# Patient Record
Sex: MALE | Race: BLACK OR AFRICAN AMERICAN | NOT HISPANIC OR LATINO | Employment: UNEMPLOYED | URBAN - METROPOLITAN AREA
[De-identification: names, ages, dates, MRNs, and addresses within clinical notes are randomized per-mention and may not be internally consistent; named-entity substitution may affect disease eponyms.]

---

## 2022-07-17 ENCOUNTER — HOSPITAL ENCOUNTER (EMERGENCY)
Facility: HOSPITAL | Age: 37
Discharge: HOME | End: 2022-07-17
Attending: FAMILY MEDICINE
Payer: MEDICAID

## 2022-07-17 VITALS
TEMPERATURE: 99 F | OXYGEN SATURATION: 98 % | HEART RATE: 95 BPM | BODY MASS INDEX: 24.11 KG/M2 | DIASTOLIC BLOOD PRESSURE: 92 MMHG | HEIGHT: 66 IN | SYSTOLIC BLOOD PRESSURE: 136 MMHG | WEIGHT: 150 LBS | RESPIRATION RATE: 20 BRPM

## 2022-07-17 DIAGNOSIS — M79.605 LEFT LEG PAIN: Primary | ICD-10-CM

## 2022-07-17 PROCEDURE — 25000003 PHARM REV CODE 250: Mod: ER | Performed by: FAMILY MEDICINE

## 2022-07-17 PROCEDURE — 99284 EMERGENCY DEPT VISIT MOD MDM: CPT | Mod: ER

## 2022-07-17 RX ORDER — METHOCARBAMOL 500 MG/1
500 TABLET, FILM COATED ORAL
Status: COMPLETED | OUTPATIENT
Start: 2022-07-17 | End: 2022-07-17

## 2022-07-17 RX ORDER — KETOROLAC TROMETHAMINE 10 MG/1
10 TABLET, FILM COATED ORAL
Status: COMPLETED | OUTPATIENT
Start: 2022-07-17 | End: 2022-07-17

## 2022-07-17 RX ORDER — CYCLOBENZAPRINE HCL 10 MG
10 TABLET ORAL 3 TIMES DAILY PRN
Qty: 30 TABLET | Refills: 0 | Status: SHIPPED | OUTPATIENT
Start: 2022-07-17 | End: 2024-03-19

## 2022-07-17 RX ORDER — NAPROXEN 500 MG/1
500 TABLET ORAL 2 TIMES DAILY WITH MEALS
Qty: 20 TABLET | Refills: 0 | Status: SHIPPED | OUTPATIENT
Start: 2022-07-17 | End: 2024-03-19

## 2022-07-17 RX ADMIN — METHOCARBAMOL TABLETS 500 MG: 500 TABLET, COATED ORAL at 05:07

## 2022-07-17 RX ADMIN — KETOROLAC TROMETHAMINE 10 MG: 10 TABLET, FILM COATED ORAL at 05:07

## 2022-07-17 NOTE — ED PROVIDER NOTES
Encounter Date: 7/17/2022       History     Chief Complaint   Patient presents with    Leg Pain     C/o pain to right anterior thigh and left inner upper thigh that started today after running. Denies any pain to testicles.     37-year-old male complains of bilateral anterior thigh pain after playing baseball.  Was stretching his legs more.  No direct injury or fall.  Able to stand up walk and bear weight.  No swelling or redness.  Patient took tramadol from his friend before coming to ED.    The history is provided by the patient.     Review of patient's allergies indicates:  No Known Allergies  History reviewed. No pertinent past medical history.  History reviewed. No pertinent surgical history.  History reviewed. No pertinent family history.  Social History     Tobacco Use    Smoking status: Current Every Day Smoker    Smokeless tobacco: Never Used   Substance Use Topics    Alcohol use: No    Drug use: Yes     Review of Systems   Constitutional: Negative for fever.   HENT: Negative for sore throat.    Respiratory: Negative for shortness of breath.    Cardiovascular: Negative for chest pain.   Gastrointestinal: Negative for nausea.   Genitourinary: Negative for dysuria.   Musculoskeletal: Positive for myalgias. Negative for back pain, gait problem, neck pain and neck stiffness.   Skin: Negative for rash.   Neurological: Negative for weakness.   Hematological: Does not bruise/bleed easily.   All other systems reviewed and are negative.      Physical Exam     Initial Vitals [07/17/22 1714]   BP Pulse Resp Temp SpO2   (!) 136/92 95 20 98.6 °F (37 °C) 98 %      MAP       --         Physical Exam    Nursing note and vitals reviewed.  Constitutional: Vital signs are normal. He appears well-developed and well-nourished. He is active. No distress.   HENT:   Head: Normocephalic.   Nose: Nose normal.   Mouth/Throat: Oropharynx is clear and moist and mucous membranes are normal.   Eyes: Conjunctivae, EOM and lids are  normal.   Neck: Neck supple.   Normal range of motion.  Cardiovascular: Normal rate, regular rhythm, S1 normal, S2 normal and normal heart sounds.   Pulmonary/Chest: Breath sounds normal.   Musculoskeletal:      Right upper arm: Normal.      Left upper arm: Normal.      Cervical back: Normal range of motion and neck supple.      Right lower leg: Normal.      Left lower leg: Normal.      Comments: Bilateral anterior thigh tenderness.     Neurological: He is alert and oriented to person, place, and time. He has normal strength. GCS eye subscore is 4. GCS verbal subscore is 5. GCS motor subscore is 6.   Skin: Skin is warm. Capillary refill takes less than 2 seconds.   Psychiatric: He has a normal mood and affect. His speech is normal and behavior is normal. Thought content normal. Cognition and memory are normal.         ED Course   Procedures  Labs Reviewed - No data to display       Imaging Results    None          Medications   ketorolac tablet 10 mg (has no administration in time range)   methocarbamoL tablet 500 mg (has no administration in time range)     Medical Decision Making:   ED Management:  Bilateral thigh strain after playing baseball.  Naproxen and cyclobenzaprine prescription.  Adequate hydration.  Follow-up ED with any worsening symptoms.                      Clinical Impression:   Final diagnoses:  [M79.605] Left leg pain (Primary)          ED Disposition Condition    Discharge Stable        ED Prescriptions     Medication Sig Dispense Start Date End Date Auth. Provider    naproxen (NAPROSYN) 500 MG tablet Take 1 tablet (500 mg total) by mouth 2 (two) times daily with meals. 20 tablet 7/17/2022  Michael Mart MD    cyclobenzaprine (FLEXERIL) 10 MG tablet Take 1 tablet (10 mg total) by mouth 3 (three) times daily as needed. 30 tablet 7/17/2022  Michael Mart MD        Follow-up Information     Follow up With Specialties Details Why Contact Info    Primarycare physician  Schedule an  appointment as soon as possible for a visit in 1 week F/U            Michael Mart MD  07/17/22 1764

## 2024-03-19 ENCOUNTER — OFFICE VISIT (OUTPATIENT)
Dept: GASTROENTEROLOGY | Facility: CLINIC | Age: 39
End: 2024-03-19
Payer: MEDICAID

## 2024-03-19 VITALS
SYSTOLIC BLOOD PRESSURE: 150 MMHG | HEART RATE: 86 BPM | WEIGHT: 162.56 LBS | DIASTOLIC BLOOD PRESSURE: 97 MMHG | BODY MASS INDEX: 26.13 KG/M2 | HEIGHT: 66 IN

## 2024-03-19 DIAGNOSIS — F17.200 TOBACCO DEPENDENCE: ICD-10-CM

## 2024-03-19 DIAGNOSIS — K21.9 GASTROESOPHAGEAL REFLUX DISEASE, UNSPECIFIED WHETHER ESOPHAGITIS PRESENT: Primary | ICD-10-CM

## 2024-03-19 PROCEDURE — 99999 PR PBB SHADOW E&M-EST. PATIENT-LVL III: CPT | Mod: PBBFAC,,, | Performed by: NURSE PRACTITIONER

## 2024-03-19 PROCEDURE — 3080F DIAST BP >= 90 MM HG: CPT | Mod: CPTII,,, | Performed by: NURSE PRACTITIONER

## 2024-03-19 PROCEDURE — 3008F BODY MASS INDEX DOCD: CPT | Mod: CPTII,,, | Performed by: NURSE PRACTITIONER

## 2024-03-19 PROCEDURE — 1160F RVW MEDS BY RX/DR IN RCRD: CPT | Mod: CPTII,,, | Performed by: NURSE PRACTITIONER

## 2024-03-19 PROCEDURE — 1159F MED LIST DOCD IN RCRD: CPT | Mod: CPTII,,, | Performed by: NURSE PRACTITIONER

## 2024-03-19 PROCEDURE — 99204 OFFICE O/P NEW MOD 45 MIN: CPT | Mod: S$PBB,,, | Performed by: NURSE PRACTITIONER

## 2024-03-19 PROCEDURE — 99213 OFFICE O/P EST LOW 20 MIN: CPT | Mod: PBBFAC,PN | Performed by: NURSE PRACTITIONER

## 2024-03-19 PROCEDURE — 3077F SYST BP >= 140 MM HG: CPT | Mod: CPTII,,, | Performed by: NURSE PRACTITIONER

## 2024-03-19 RX ORDER — OMEPRAZOLE 40 MG/1
40 CAPSULE, DELAYED RELEASE ORAL DAILY
Qty: 30 CAPSULE | Refills: 2 | Status: SHIPPED | OUTPATIENT
Start: 2024-03-19 | End: 2025-03-19

## 2024-03-19 NOTE — PROGRESS NOTES
"Subjective:       Patient ID: Carlos Mayorga is a 38 y.o. male.    Chief Complaint: Gastroesophageal Reflux    37 y/o male presents to clinic with c/o GERD.     Gastroesophageal Reflux  He complains of abdominal pain, belching, heartburn and nausea. He reports no chest pain, no choking, no coughing, no dysphagia, no globus sensation or no wheezing. This is a recurrent problem. The current episode started more than 1 month ago. The problem has been gradually worsening. The heartburn duration is several minutes. The heartburn is located in the abdomen and substernum. The symptoms are aggravated by certain foods (fried foods). Risk factors include smoking/tobacco exposure. He has tried an antacid (TUMS) for the symptoms. The treatment provided mild relief.       History reviewed. No pertinent past medical history.    History reviewed. No pertinent surgical history.    History reviewed. No pertinent family history.    Social History     Socioeconomic History    Marital status: Single   Tobacco Use    Smoking status: Every Day    Smokeless tobacco: Never   Substance and Sexual Activity    Alcohol use: No    Drug use: Yes    Sexual activity: Not Currently       Review of Systems   Constitutional:  Negative for appetite change and unexpected weight change.   HENT:  Negative for trouble swallowing.    Respiratory:  Negative for cough, choking and wheezing.    Cardiovascular:  Negative for chest pain.   Gastrointestinal:  Positive for abdominal pain, heartburn and nausea. Negative for constipation, diarrhea and dysphagia.   Psychiatric/Behavioral:  Negative for dysphoric mood.          Objective:     Vitals:    03/19/24 0809   BP: (!) 150/97   BP Location: Left arm   Patient Position: Sitting   BP Method: Medium (Automatic)   Pulse: 86   Weight: 73.8 kg (162 lb 9.4 oz)   Height: 5' 6" (1.676 m)          Physical Exam  Constitutional:       General: He is not in acute distress.     Appearance: Normal appearance.   HENT:     "  Head: Normocephalic.   Eyes:      Conjunctiva/sclera: Conjunctivae normal.   Pulmonary:      Effort: Pulmonary effort is normal. No respiratory distress.   Musculoskeletal:         General: Normal range of motion.      Cervical back: Normal range of motion.   Skin:     General: Skin is warm and dry.   Neurological:      Mental Status: He is alert and oriented to person, place, and time.   Psychiatric:         Mood and Affect: Mood normal.         Behavior: Behavior normal.               Assessment:         ICD-10-CM ICD-9-CM   1. Gastroesophageal reflux disease, unspecified whether esophagitis present  K21.9 530.81   2. Tobacco dependence  F17.200 305.1       Plan:       Gastroesophageal reflux disease, unspecified whether esophagitis present  -     omeprazole (PRILOSEC) 40 MG capsule; Take 1 capsule (40 mg total) by mouth once daily.  Dispense: 30 capsule; Refill: 2  -     Case Request Endoscopy: EGD (ESOPHAGOGASTRODUODENOSCOPY)  -     CBC Auto Differential; Future; Expected date: 03/19/2024  -     Comprehensive Metabolic Panel; Future; Expected date: 03/19/2024    Tobacco dependence        -    Encouraged smoking cessation        -    Patient declined referral to smoking cessation         program      Follow up if symptoms worsen or fail to improve.     Patient's Medications   New Prescriptions    OMEPRAZOLE (PRILOSEC) 40 MG CAPSULE    Take 1 capsule (40 mg total) by mouth once daily.   Previous Medications    No medications on file   Modified Medications    No medications on file   Discontinued Medications    CYCLOBENZAPRINE (FLEXERIL) 10 MG TABLET    Take 1 tablet (10 mg total) by mouth 3 (three) times daily as needed.    IBUPROFEN (ADVIL,MOTRIN) 600 MG TABLET    Take 1 tablet (600 mg total) by mouth every 6 (six) hours as needed for Pain.    MOMETASONE (NASONEX) 50 MCG/ACTUATION NASAL SPRAY    2 sprays by Nasal route once daily.    NAPROXEN (NAPROSYN) 500 MG TABLET    Take 1 tablet (500 mg total) by mouth 2  (two) times daily with meals.

## 2024-03-19 NOTE — PATIENT INSTRUCTIONS
EGD Prep Instructions    [facility] [address] [place]  [address]    You are scheduled for an EGD with Dr. Rose on 4/2/2024 at [facility].  You will enter through the [facility] entrance and check in at Same Day Surgery.    Nothing to eat or drink after midnight before the procedure.  You MAY brush your teeth.    You MAY take your blood pressure, heart, and seizure medication on the morning of the procedure, with a SIP of water.  Hold ALL other medications until after the procedure.    You must have someone with you to DRIVE YOU HOME since you will be receiving IV sedation for the procedure.    If you are on blood thinners THAT YOU HAVE BEEN INSTRUCTED TO HOLD BY YOUR DOCTOR FOR THIS PROCEDURE, then do NOT take this the morning of your EGD.  Do NOT stop these medications on your own, they must be approved to be held by your doctor.  Your EGD can NOT be done if you are on these medications.  Examples of blood thinners include: Coumadin, Aggrenox, Plavix, Pradaxa, Reapro, Pletal, Xarelto, Ticagrelor, Brilinta, Eliquis, and high dose aspirin (325 mg).  You do not have to stop baby aspirin 81 mg.    You will receive a call the afternoon before your EGD to tell you the time to arrive.  If you have not received a call by the day before your procedure, call the Pre-op Coordinator at [phone].

## 2024-03-27 ENCOUNTER — TELEPHONE (OUTPATIENT)
Dept: GASTROENTEROLOGY | Facility: CLINIC | Age: 39
End: 2024-03-27
Payer: MEDICAID

## 2024-03-27 NOTE — TELEPHONE ENCOUNTER
Pt rescheduled EGD procedure to 4/9. Informed pt I will call him a day before with his arrival time. Pt voiced understanding.         ----- Message from Gato Kraus sent at 3/27/2024  2:23 PM CDT -----  Type:  Patient Returning Call    Who Called: pt  Who Left Message for Patient: unknown  Does the patient know what this is regarding?:   Would the patient rather a call back or a response via [facility]? call  Best Call Back Number: [phone]  Additional Information:

## 2024-04-08 ENCOUNTER — TELEPHONE (OUTPATIENT)
Dept: GASTROENTEROLOGY | Facility: CLINIC | Age: 39
End: 2024-04-08
Payer: MEDICAID

## 2024-04-08 NOTE — TELEPHONE ENCOUNTER
instructed pt arrival time of 1100 in SDS. informed pt nothing to eat or drink after midnight. pt voiced understanding.

## 2024-04-15 ENCOUNTER — PATIENT MESSAGE (OUTPATIENT)
Dept: GASTROENTEROLOGY | Facility: CLINIC | Age: 39
End: 2024-04-15
Payer: MEDICAID

## 2024-04-22 ENCOUNTER — TELEPHONE (OUTPATIENT)
Dept: GASTROENTEROLOGY | Facility: CLINIC | Age: 39
End: 2024-04-22
Payer: MEDICAID

## 2024-04-22 NOTE — TELEPHONE ENCOUNTER
Called pt with EGD results. Instructed pt to continue taking Omeprazole daily and RTC as needed. Pt voiced understanding.       ----- Message from Grazyna Rose MD sent at 4/13/2024  9:18 AM CDT -----  HP (-), cont PPI, RTC as needed

## 2024-04-22 NOTE — TELEPHONE ENCOUNTER
----- Message from Grazyna Rose MD sent at 4/13/2024  9:18 AM CDT -----  HP (-), cont PPI, RTC as needed